# Patient Record
Sex: MALE | Race: WHITE
[De-identification: names, ages, dates, MRNs, and addresses within clinical notes are randomized per-mention and may not be internally consistent; named-entity substitution may affect disease eponyms.]

---

## 2019-06-28 ENCOUNTER — HOSPITAL ENCOUNTER (OUTPATIENT)
Dept: HOSPITAL 63 - RAD | Age: 69
Discharge: HOME | End: 2019-06-28
Payer: MEDICARE

## 2019-06-28 DIAGNOSIS — R91.1: ICD-10-CM

## 2019-06-28 DIAGNOSIS — M46.04: Primary | ICD-10-CM

## 2019-06-28 PROCEDURE — 71046 X-RAY EXAM CHEST 2 VIEWS: CPT

## 2019-06-28 NOTE — RAD
Chest, 2 views, 6/28/2019:

 

HISTORY: Cough

 

The heart size and pulmonary vascularity are normal. A tiny dense nodule 

in the right base is compatible with a granuloma. No pulmonary 

consolidation is seen. There is no evidence of pleural fluid. There is an 

old healed fracture of the right fourth rib posterolaterally. Minimal 

spurring is present in the spine.

 

IMPRESSION: No acute cardiopulmonary abnormality is detected.

 

Electronically signed by: Rick Moritz, MD (6/28/2019 3:13 PM) [facility]